# Patient Record
Sex: MALE | Race: WHITE | NOT HISPANIC OR LATINO | Employment: OTHER | URBAN - METROPOLITAN AREA
[De-identification: names, ages, dates, MRNs, and addresses within clinical notes are randomized per-mention and may not be internally consistent; named-entity substitution may affect disease eponyms.]

---

## 2023-02-07 ENCOUNTER — OFFICE VISIT (OUTPATIENT)
Dept: FAMILY MEDICINE | Facility: CLINIC | Age: 78
End: 2023-02-07
Payer: MEDICARE

## 2023-02-07 VITALS
OXYGEN SATURATION: 96 % | SYSTOLIC BLOOD PRESSURE: 130 MMHG | DIASTOLIC BLOOD PRESSURE: 82 MMHG | BODY MASS INDEX: 30.46 KG/M2 | WEIGHT: 201 LBS | TEMPERATURE: 98 F | HEART RATE: 64 BPM | HEIGHT: 68 IN

## 2023-02-07 DIAGNOSIS — E78.5 HYPERLIPIDEMIA, UNSPECIFIED HYPERLIPIDEMIA TYPE: ICD-10-CM

## 2023-02-07 DIAGNOSIS — I10 HYPERTENSION, UNSPECIFIED TYPE: ICD-10-CM

## 2023-02-07 DIAGNOSIS — E55.9 VITAMIN D DEFICIENCY: ICD-10-CM

## 2023-02-07 DIAGNOSIS — Z95.5 PRESENCE OF STENT IN CORONARY ARTERY: Primary | ICD-10-CM

## 2023-02-07 DIAGNOSIS — G62.9 NEUROPATHY: ICD-10-CM

## 2023-02-07 DIAGNOSIS — M1A.9XX0 CHRONIC GOUT WITHOUT TOPHUS, UNSPECIFIED CAUSE, UNSPECIFIED SITE: ICD-10-CM

## 2023-02-07 PROBLEM — I25.2 HISTORY OF MYOCARDIAL INFARCTION: Status: ACTIVE | Noted: 2017-12-24

## 2023-02-07 PROCEDURE — 3075F PR MOST RECENT SYSTOLIC BLOOD PRESS GE 130-139MM HG: ICD-10-PCS | Mod: CPTII,S$GLB,, | Performed by: FAMILY MEDICINE

## 2023-02-07 PROCEDURE — 1101F PR PT FALLS ASSESS DOC 0-1 FALLS W/OUT INJ PAST YR: ICD-10-PCS | Mod: CPTII,S$GLB,, | Performed by: FAMILY MEDICINE

## 2023-02-07 PROCEDURE — 1159F PR MEDICATION LIST DOCUMENTED IN MEDICAL RECORD: ICD-10-PCS | Mod: CPTII,S$GLB,, | Performed by: FAMILY MEDICINE

## 2023-02-07 PROCEDURE — 3079F PR MOST RECENT DIASTOLIC BLOOD PRESSURE 80-89 MM HG: ICD-10-PCS | Mod: CPTII,S$GLB,, | Performed by: FAMILY MEDICINE

## 2023-02-07 PROCEDURE — 99204 OFFICE O/P NEW MOD 45 MIN: CPT | Mod: S$GLB,,, | Performed by: FAMILY MEDICINE

## 2023-02-07 PROCEDURE — 1126F PR PAIN SEVERITY QUANTIFIED, NO PAIN PRESENT: ICD-10-PCS | Mod: CPTII,S$GLB,, | Performed by: FAMILY MEDICINE

## 2023-02-07 PROCEDURE — 99204 PR OFFICE/OUTPT VISIT, NEW, LEVL IV, 45-59 MIN: ICD-10-PCS | Mod: S$GLB,,, | Performed by: FAMILY MEDICINE

## 2023-02-07 PROCEDURE — 3079F DIAST BP 80-89 MM HG: CPT | Mod: CPTII,S$GLB,, | Performed by: FAMILY MEDICINE

## 2023-02-07 PROCEDURE — 3075F SYST BP GE 130 - 139MM HG: CPT | Mod: CPTII,S$GLB,, | Performed by: FAMILY MEDICINE

## 2023-02-07 PROCEDURE — 1159F MED LIST DOCD IN RCRD: CPT | Mod: CPTII,S$GLB,, | Performed by: FAMILY MEDICINE

## 2023-02-07 PROCEDURE — 1126F AMNT PAIN NOTED NONE PRSNT: CPT | Mod: CPTII,S$GLB,, | Performed by: FAMILY MEDICINE

## 2023-02-07 PROCEDURE — 3288F FALL RISK ASSESSMENT DOCD: CPT | Mod: CPTII,S$GLB,, | Performed by: FAMILY MEDICINE

## 2023-02-07 PROCEDURE — 3288F PR FALLS RISK ASSESSMENT DOCUMENTED: ICD-10-PCS | Mod: CPTII,S$GLB,, | Performed by: FAMILY MEDICINE

## 2023-02-07 PROCEDURE — 1101F PT FALLS ASSESS-DOCD LE1/YR: CPT | Mod: CPTII,S$GLB,, | Performed by: FAMILY MEDICINE

## 2023-02-07 RX ORDER — BENAZEPRIL HYDROCHLORIDE 20 MG/1
TABLET ORAL
COMMUNITY
Start: 2022-03-11

## 2023-02-07 RX ORDER — ALLOPURINOL 300 MG/1
300 TABLET ORAL
COMMUNITY
Start: 2022-04-06 | End: 2023-02-07 | Stop reason: SDUPTHER

## 2023-02-07 RX ORDER — METOPROLOL SUCCINATE 25 MG/1
TABLET, EXTENDED RELEASE ORAL
COMMUNITY
Start: 2022-03-14

## 2023-02-07 RX ORDER — IBUPROFEN 100 MG/5ML
1000 SUSPENSION, ORAL (FINAL DOSE FORM) ORAL DAILY
COMMUNITY

## 2023-02-07 RX ORDER — CHOLECALCIFEROL (VITAMIN D3) 25 MCG
1000 TABLET ORAL DAILY
COMMUNITY

## 2023-02-07 RX ORDER — LANOLIN ALCOHOL/MO/W.PET/CERES
1 CREAM (GRAM) TOPICAL
COMMUNITY

## 2023-02-07 RX ORDER — ALLOPURINOL 300 MG/1
300 TABLET ORAL DAILY
Qty: 90 TABLET | Refills: 3 | Status: SHIPPED | OUTPATIENT
Start: 2023-02-07 | End: 2024-02-08 | Stop reason: SDUPTHER

## 2023-02-07 RX ORDER — PANTOPRAZOLE SODIUM 40 MG/1
TABLET, DELAYED RELEASE ORAL
COMMUNITY
Start: 2023-01-06

## 2023-02-07 RX ORDER — ATORVASTATIN CALCIUM 40 MG/1
TABLET, FILM COATED ORAL
COMMUNITY
Start: 2022-12-24

## 2023-02-07 RX ORDER — LANOLIN ALCOHOL/MO/W.PET/CERES
100 CREAM (GRAM) TOPICAL DAILY
COMMUNITY

## 2023-02-07 NOTE — PROGRESS NOTES
"Family Medicine Note  [facility]    Chief Complaint   Patient presents with    Establish Care    Numbness     In both feet        HPI:  77 male here to establish care.    History of CAD, s/p MI and subsequent stent - Dr Yung, [place] - just had stress test last week    Blood Pressure at goal on medication, with patient reporting prescription compliance  Hyperlipidemia stable on appropriate intensity statin therapy  Gout stable on medication    Acutely, does report occasional numbness to B feet/toes.  Has since started vitamin supplement to help with this, and symptoms have slowed.      ROS: as above    Vitals:    02/07/23 1009 02/07/23 1026   BP: (!) 146/80 130/82   BP Location: Left arm    Patient Position: Sitting    BP Method: Medium (Manual)    Pulse: 64    Temp: 97.5 °F (36.4 °C)    TempSrc: Temporal    SpO2: 96%    Weight: 91.2 kg (201 lb)    Height: 5' 8" (1.727 m)       Body mass index is 30.56 kg/m².      General:  AOx3, well nourished and developed in no acute distress  Eyes:  PERRLA, EOMI, vision intact grossly  ENT:  normal hearing, moist oral mucosa  Neck:  trachea midline with no masses or thyromegaly  Heart:  RRR, no murmurs.  No edema noted, extremities warm and well perfused  Lungs:  clear to auscultation bilaterally with symmetric chest movement  Abdomen:  Soft, nontender, nondistended.  Normal bowel sounds  Musculoskeletal:  Normal gait.  Normal posture.  Normal muscular development with no joint swelling.  Neurological:  CN II-XII grossly intact. Symmetric strength and sensation  Psych:  Normal mood and affect.  Able to demonstrate good judgement and personal insight.      Assessment/Plan:    Presence of stent in coronary artery    Hyperlipidemia, unspecified hyperlipidemia type    Hypertension, unspecified type    Chronic gout without tophus, unspecified cause, unspecified site    Vitamin D deficiency    Neuropathy     1.  Stable, continue therapy - get " bloodwork from cardiology  2.  Stable on statin  3.  At goal  4.  Stable  5.  Continue supplementation  6.  Seems more tarsal tunnel.  Advised better arch and ankle support

## 2023-02-07 NOTE — PATIENT INSTRUCTIONS
Focus on better arch and ankle support with footwear    Will get bloodwork from Dr. Yung    Follow up 6 months

## 2024-02-08 ENCOUNTER — OFFICE VISIT (OUTPATIENT)
Dept: FAMILY MEDICINE | Facility: CLINIC | Age: 79
End: 2024-02-08
Payer: MEDICARE

## 2024-02-08 VITALS
WEIGHT: 195.13 LBS | HEIGHT: 68 IN | OXYGEN SATURATION: 92 % | BODY MASS INDEX: 29.57 KG/M2 | HEART RATE: 92 BPM | SYSTOLIC BLOOD PRESSURE: 118 MMHG | DIASTOLIC BLOOD PRESSURE: 60 MMHG

## 2024-02-08 DIAGNOSIS — G62.9 NEUROPATHY: ICD-10-CM

## 2024-02-08 DIAGNOSIS — E78.5 HYPERLIPIDEMIA, UNSPECIFIED HYPERLIPIDEMIA TYPE: ICD-10-CM

## 2024-02-08 DIAGNOSIS — I25.2 HISTORY OF MYOCARDIAL INFARCTION: ICD-10-CM

## 2024-02-08 DIAGNOSIS — I10 HYPERTENSION, UNSPECIFIED TYPE: ICD-10-CM

## 2024-02-08 DIAGNOSIS — M1A.9XX0 CHRONIC GOUT WITHOUT TOPHUS, UNSPECIFIED CAUSE, UNSPECIFIED SITE: Primary | ICD-10-CM

## 2024-02-08 PROCEDURE — 1126F AMNT PAIN NOTED NONE PRSNT: CPT | Mod: CPTII,S$GLB,, | Performed by: FAMILY MEDICINE

## 2024-02-08 PROCEDURE — 3074F SYST BP LT 130 MM HG: CPT | Mod: CPTII,S$GLB,, | Performed by: FAMILY MEDICINE

## 2024-02-08 PROCEDURE — 3078F DIAST BP <80 MM HG: CPT | Mod: CPTII,S$GLB,, | Performed by: FAMILY MEDICINE

## 2024-02-08 PROCEDURE — 1101F PT FALLS ASSESS-DOCD LE1/YR: CPT | Mod: CPTII,S$GLB,, | Performed by: FAMILY MEDICINE

## 2024-02-08 PROCEDURE — 3288F FALL RISK ASSESSMENT DOCD: CPT | Mod: CPTII,S$GLB,, | Performed by: FAMILY MEDICINE

## 2024-02-08 PROCEDURE — 1159F MED LIST DOCD IN RCRD: CPT | Mod: CPTII,S$GLB,, | Performed by: FAMILY MEDICINE

## 2024-02-08 PROCEDURE — 99214 OFFICE O/P EST MOD 30 MIN: CPT | Mod: S$GLB,,, | Performed by: FAMILY MEDICINE

## 2024-02-08 RX ORDER — GABAPENTIN 400 MG/1
400 CAPSULE ORAL 3 TIMES DAILY
COMMUNITY
Start: 2023-12-07 | End: 2024-02-08 | Stop reason: SDUPTHER

## 2024-02-08 RX ORDER — GABAPENTIN 400 MG/1
400 CAPSULE ORAL 3 TIMES DAILY
Qty: 270 CAPSULE | Refills: 3 | Status: SHIPPED | OUTPATIENT
Start: 2024-02-08 | End: 2025-02-07

## 2024-02-08 RX ORDER — ALLOPURINOL 300 MG/1
300 TABLET ORAL DAILY
Qty: 90 TABLET | Refills: 3 | Status: SHIPPED | OUTPATIENT
Start: 2024-02-08 | End: 2025-02-07

## 2024-02-08 NOTE — PROGRESS NOTES
"    [facility] [place]    Family Medicine Office Visit    Chief Complaint   Patient presents with    Follow-up    Establish Care        HPI:  1 year follow up    78 year old gentleman in good functional health    CAD stable - had stent in 2017 - actively sees Cardiology    Blood Pressure at goal on medication, with patient reporting prescription compliance  Hyperlipidemia stable on appropriate intensity statin therapy  Gout stable on allopurinol    Discussed some functional neuropathy last visit    ROS: as above    Vitals:    02/08/24 1102   BP: 118/60   BP Location: Right arm   Patient Position: Sitting   BP Method: Medium (Manual)   Pulse: 92   SpO2: (!) 92%   Weight: 88.5 kg (195 lb 1.6 oz)   Height: 5' 8" (1.727 m)      Body mass index is 29.66 kg/m².      General:  AOx3, well nourished and developed in no acute distress  Eyes:  PERRLA, EOMI, vision intact grossly  ENT:  normal hearing, moist oral mucosa  Neck:  trachea midline with no masses or thyromegaly  Heart:  RRR, no murmurs.  No edema noted, extremities warm and well perfused  Lungs:  clear to auscultation bilaterally with symmetric chest movement  Abdomen:  Soft, nontender, nondistended.  Normal bowel sounds  Musculoskeletal:  Normal gait.  Normal posture.  Normal muscular development with no joint swelling.  Neurological:  CN II-XII grossly intact. Symmetric strength and sensation  Psych:  Normal mood and affect.  Able to demonstrate good judgement and personal insight.      Assessment/Plan:    1. Chronic gout without tophus, unspecified cause, unspecified site  Overview:  toes      2. History of myocardial infarction    3. Hyperlipidemia, unspecified hyperlipidemia type    4. Hypertension, unspecified type    5. Neuropathy       Stable on allopurinol  Stable on statin and BP control  Stable as above  At goal  Stable on gabapentin      "

## 2024-06-26 ENCOUNTER — OFFICE VISIT (OUTPATIENT)
Dept: FAMILY MEDICINE | Facility: CLINIC | Age: 79
End: 2024-06-26
Payer: MEDICARE

## 2024-06-26 VITALS
WEIGHT: 168.81 LBS | OXYGEN SATURATION: 97 % | HEIGHT: 69 IN | HEART RATE: 97 BPM | BODY MASS INDEX: 25 KG/M2 | DIASTOLIC BLOOD PRESSURE: 68 MMHG | SYSTOLIC BLOOD PRESSURE: 126 MMHG

## 2024-06-26 DIAGNOSIS — B96.89 BACTERIAL SINUSITIS: Primary | ICD-10-CM

## 2024-06-26 DIAGNOSIS — J32.9 BACTERIAL SINUSITIS: Primary | ICD-10-CM

## 2024-06-26 PROCEDURE — 3074F SYST BP LT 130 MM HG: CPT | Mod: CPTII,S$GLB,, | Performed by: STUDENT IN AN ORGANIZED HEALTH CARE EDUCATION/TRAINING PROGRAM

## 2024-06-26 PROCEDURE — 3078F DIAST BP <80 MM HG: CPT | Mod: CPTII,S$GLB,, | Performed by: STUDENT IN AN ORGANIZED HEALTH CARE EDUCATION/TRAINING PROGRAM

## 2024-06-26 PROCEDURE — 99214 OFFICE O/P EST MOD 30 MIN: CPT | Mod: S$GLB,,, | Performed by: STUDENT IN AN ORGANIZED HEALTH CARE EDUCATION/TRAINING PROGRAM

## 2024-06-26 PROCEDURE — 1160F RVW MEDS BY RX/DR IN RCRD: CPT | Mod: CPTII,S$GLB,, | Performed by: STUDENT IN AN ORGANIZED HEALTH CARE EDUCATION/TRAINING PROGRAM

## 2024-06-26 PROCEDURE — 1159F MED LIST DOCD IN RCRD: CPT | Mod: CPTII,S$GLB,, | Performed by: STUDENT IN AN ORGANIZED HEALTH CARE EDUCATION/TRAINING PROGRAM

## 2024-06-26 PROCEDURE — 1126F AMNT PAIN NOTED NONE PRSNT: CPT | Mod: CPTII,S$GLB,, | Performed by: STUDENT IN AN ORGANIZED HEALTH CARE EDUCATION/TRAINING PROGRAM

## 2024-06-26 RX ORDER — AZITHROMYCIN 250 MG/1
TABLET, FILM COATED ORAL
Qty: 6 TABLET | Refills: 0 | Status: SHIPPED | OUTPATIENT
Start: 2024-06-26 | End: 2024-07-01

## 2024-06-26 NOTE — PROGRESS NOTES
"  [facility]    [facility]  [address], [address]  [place] [address]    Subjective     Patient ID: Rell Wilson is a 78 y.o. male who comes to the clinic for an acute visit.    Chief Complaint: Sore Throat (Patient states he has a cough, sore throat, chest congestion and nasal drip for over a week. )    Cough and sore throat - started about a week ago, works at the [employer]. Hurts when he coughs, now having pleurisy when he coughs.     ROS negative unless stated above       Objective     Vitals:    06/26/24 0821   BP: 126/68   Pulse: 97   SpO2: 97%   Weight: 76.6 kg (168 lb 12.8 oz)   Height: 5' 9" (1.753 m)       Wt Readings from Last 3 Encounters:   06/26/24 0821 76.6 kg (168 lb 12.8 oz)   02/08/24 1102 88.5 kg (195 lb 1.6 oz)   02/07/23 1009 91.2 kg (201 lb)        Physical Exam  Vitals reviewed.   Constitutional:       Appearance: Normal appearance.   HENT:      Head: Normocephalic and atraumatic.      Mouth/Throat:      Pharynx: Posterior oropharyngeal erythema present.   Eyes:      Extraocular Movements: Extraocular movements intact.      Pupils: Pupils are equal, round, and reactive to light.   Cardiovascular:      Rate and Rhythm: Normal rate.      Pulses: Normal pulses.      Heart sounds: Normal heart sounds.   Pulmonary:      Effort: Pulmonary effort is normal. No respiratory distress.      Breath sounds: Normal breath sounds.   Musculoskeletal:         General: Normal range of motion.      Cervical back: Normal range of motion and neck supple.   Skin:     General: Skin is dry.      Capillary Refill: Capillary refill takes less than 2 seconds.   Neurological:      General: No focal deficit present.      Mental Status: He is alert and oriented to person, place, and time. Mental status is at baseline.   Psychiatric:         Mood and Affect: Mood normal.         Behavior: Behavior normal.         Thought Content: Thought content normal.         Current " Outpatient Medications   Medication Instructions    allopurinoL (ZYLOPRIM) 300 mg, Oral, Daily    ascorbic acid (vitamin C) (VITAMIN C) 1,000 mg, Oral, Daily    atorvastatin (LIPITOR) 40 MG tablet Oral    azithromycin (Z-SANTHOSH) 250 MG tablet Take 2 tablets by mouth on day 1; Take 1 tablet by mouth on days 2-5<BR>    benazepriL (LOTENSIN) 20 MG tablet   TAKE 1 TABLET ONCE DAILY, # 90, 0 Refill(s), Maintenance    cyanocobalamin (VITAMIN B-12) 100 mcg, Oral, Daily    ferrous sulfate (FEOSOL) Tab tablet 1 tablet, Oral, With breakfast    gabapentin (NEURONTIN) 400 mg, Oral, 3 times daily    metoprolol succinate (TOPROL-XL) 25 MG 24 hr tablet   TAKE 1 TABLET ONCE DAILY, # 90, 0 Refill(s), Maintenance    pantoprazole (PROTONIX) 40 MG tablet Oral    vitamin D (VITAMIN D3) 1,000 Units, Oral, Daily           Assessment and Plan     1. Bacterial sinusitis  -     azithromycin (Z-SANTHOSH) 250 MG tablet; Take 2 tablets by mouth on day 1; Take 1 tablet by mouth on days 2-5  Dispense: 6 tablet; Refill: 0  -     POCT Strep A, Molecular        Here for an acute visit    Treating w/ azithromycin, will continue using flonase, zyrtec, tylenol, ibuprofen, and tea w/ honey    RTC w/ PCP in February         I encouraged the patient to take all medications as prescribed and to keep follow up appointments with their providers. Patient stated they had no other concerns. Questions were invited and answered. Follow up sooner if need. ED precautions given.    Fitz Garcia MD  06/26/2024 8:28 AM

## 2024-12-19 ENCOUNTER — TELEPHONE (OUTPATIENT)
Dept: FAMILY MEDICINE | Facility: CLINIC | Age: 79
End: 2024-12-19
Payer: MEDICARE

## 2024-12-19 NOTE — TELEPHONE ENCOUNTER
Attempted to call patient 10/11. And 12/19(2x). No answer. Left voicemail to reschedule appointment.

## 2024-12-28 NOTE — TELEPHONE ENCOUNTER
Care Due:                  Date            Visit Type   Department     Provider  --------------------------------------------------------------------------------                                SAME DAY -                              ESTABLISHED   [facility]  Last Visit: 06-      PATIENT      MEDICINE       Fitz Garcia  Next Visit: None Scheduled  None         None Found                                                            Last  Test          Frequency    Reason                     Performed    Due Date  --------------------------------------------------------------------------------    CBC.........  12 months..  allopurinoL..............  Not Found    Overdue    CMP.........  12 months..  allopurinoL..............  Not Found    Overdue    Uric Acid...  12 months..  allopurinoL..............  Not Found    Overdue    [employer] Embedded Care Due Messages. Reference number: [id number].   12/28/2024 2:07:25 AM CST

## 2024-12-29 NOTE — TELEPHONE ENCOUNTER
Refill Routing Note   Medication(s) are not appropriate for processing by [facility] for the following reason(s):        Required labs outdated    [facility] action(s):  Defer     Requires labs : Yes             Appointments  past 12m or future 3m with PCP    Date Provider   Last Visit   2/8/2024 Fitz Arshad MD   Next Visit   Visit date not found Fitz Arshad MD   ED visits in past 90 days: 0        Note composed:6:41 PM 12/28/2024

## 2024-12-30 RX ORDER — ALLOPURINOL 300 MG/1
300 TABLET ORAL
Qty: 90 TABLET | Refills: 0 | Status: SHIPPED | OUTPATIENT
Start: 2024-12-30

## 2025-02-19 ENCOUNTER — OFFICE VISIT (OUTPATIENT)
Dept: FAMILY MEDICINE | Facility: CLINIC | Age: 80
End: 2025-02-19
Payer: MEDICARE

## 2025-02-19 VITALS
SYSTOLIC BLOOD PRESSURE: 120 MMHG | HEIGHT: 69 IN | DIASTOLIC BLOOD PRESSURE: 62 MMHG | BODY MASS INDEX: 26.38 KG/M2 | WEIGHT: 178.13 LBS

## 2025-02-19 DIAGNOSIS — M72.2 PLANTAR FASCIITIS: ICD-10-CM

## 2025-02-19 DIAGNOSIS — E78.5 HYPERLIPIDEMIA, UNSPECIFIED HYPERLIPIDEMIA TYPE: ICD-10-CM

## 2025-02-19 DIAGNOSIS — L30.9 DERMATITIS: ICD-10-CM

## 2025-02-19 DIAGNOSIS — I10 HYPERTENSION, UNSPECIFIED TYPE: ICD-10-CM

## 2025-02-19 DIAGNOSIS — I25.2 HISTORY OF MYOCARDIAL INFARCTION: Primary | ICD-10-CM

## 2025-02-19 DIAGNOSIS — M1A.9XX0 CHRONIC GOUT WITHOUT TOPHUS, UNSPECIFIED CAUSE, UNSPECIFIED SITE: ICD-10-CM

## 2025-02-19 PROCEDURE — 3078F DIAST BP <80 MM HG: CPT | Mod: CPTII,S$GLB,, | Performed by: FAMILY MEDICINE

## 2025-02-19 PROCEDURE — 99214 OFFICE O/P EST MOD 30 MIN: CPT | Mod: S$GLB,,, | Performed by: FAMILY MEDICINE

## 2025-02-19 PROCEDURE — 1125F AMNT PAIN NOTED PAIN PRSNT: CPT | Mod: CPTII,S$GLB,, | Performed by: FAMILY MEDICINE

## 2025-02-19 PROCEDURE — 1159F MED LIST DOCD IN RCRD: CPT | Mod: CPTII,S$GLB,, | Performed by: FAMILY MEDICINE

## 2025-02-19 PROCEDURE — G2211 COMPLEX E/M VISIT ADD ON: HCPCS | Mod: S$GLB,,, | Performed by: FAMILY MEDICINE

## 2025-02-19 PROCEDURE — 3288F FALL RISK ASSESSMENT DOCD: CPT | Mod: CPTII,S$GLB,, | Performed by: FAMILY MEDICINE

## 2025-02-19 PROCEDURE — 3074F SYST BP LT 130 MM HG: CPT | Mod: CPTII,S$GLB,, | Performed by: FAMILY MEDICINE

## 2025-02-19 PROCEDURE — 1101F PT FALLS ASSESS-DOCD LE1/YR: CPT | Mod: CPTII,S$GLB,, | Performed by: FAMILY MEDICINE

## 2025-02-19 RX ORDER — ALLOPURINOL 300 MG/1
300 TABLET ORAL DAILY
Qty: 90 TABLET | Refills: 3 | Status: SHIPPED | OUTPATIENT
Start: 2025-02-19 | End: 2025-02-19

## 2025-02-19 RX ORDER — TRIAMCINOLONE ACETONIDE 1 MG/G
CREAM TOPICAL 2 TIMES DAILY
Qty: 454 G | Refills: 3 | Status: SHIPPED | OUTPATIENT
Start: 2025-02-19

## 2025-02-19 RX ORDER — ALLOPURINOL 300 MG/1
300 TABLET ORAL DAILY
Qty: 90 TABLET | Refills: 3 | Status: SHIPPED | OUTPATIENT
Start: 2025-02-19

## 2025-02-19 NOTE — PATIENT INSTRUCTIONS
Massage foot daily with voltaren gel - anti-inflammatory gel  Roll foot over ice twice a day  Perform stretches to foot daily  Get arch support insole    Use prescription cream for rash

## 2025-02-19 NOTE — PROGRESS NOTES
"    [facility] - [place], [place]    Family Medicine Office Visit    Chief Complaint   Patient presents with    Follow-up        HPI:  routine followup:    Gout stable  CAD stable s/p stent    Blood Pressure at goal on medication, with patient reporting prescription compliance  Hyperlipidemia stable on appropriate intensity statin therapy    Reporting L heel pain.  Worse with ambulation.  Rest helps.  On feet most of day with work.  Pressure to plantar fascia seems to help somewhat    Vague dermatitis to L lower leg    ROS: as above    Vitals:    02/19/25 0924   BP: 120/62   BP Location: Right arm   Patient Position: Sitting   Weight: 80.8 kg (178 lb 1.6 oz)   Height: 5' 9" (1.753 m)      Body mass index is 26.3 kg/m².      General:  AOx3, well nourished and developed in no acute distress  Eyes:  PERRLA, EOMI, vision intact grossly  ENT:  normal hearing, moist oral mucosa  Neck:  trachea midline with no masses or thyromegaly  Heart:  RRR, no murmurs.  No edema noted, extremities warm and well perfused  Lungs:  clear to auscultation bilaterally with symmetric chest movement  Abdomen:  Soft, nontender, nondistended.  Normal bowel sounds  Musculoskeletal:  Normal gait.  Normal posture.  Normal muscular development with no joint swelling.  Neurological:  CN II-XII grossly intact. Symmetric strength and sensation  Psych:  Normal mood and affect.  Able to demonstrate good judgement and personal insight.  +dermatitis to leg    Assessment/Plan:    1. History of myocardial infarction    2. Hyperlipidemia, unspecified hyperlipidemia type    3. Hypertension, unspecified type    4. Chronic gout without tophus, unspecified cause, unspecified site  Overview:  toes      5. Plantar fasciitis    6. Dermatitis    Other orders  -     allopurinoL (ZYLOPRIM) 300 MG tablet; Take 1 tablet (300 mg total) by mouth once daily.  Dispense: 90 tablet; Refill: 3  -     triamcinolone acetonide 0.1% (KENALOG) 0.1 % " cream; Apply topically 2 (two) times daily.  Dispense: 454 g; Refill: 3         Stable  Stable on statin  At goal  Stable on medication  Advised on therapy to resolve  Topical steroids    Visit today included increased complexity associated with the care of the episodic problem htn, hld, dermatitis, plantar fascia addressed and managing the longitudinal care of the patient due to the serious and/or complex managed problem(s) htn, hld, dermatitis, plantar fascia.

## 2025-08-20 ENCOUNTER — OFFICE VISIT (OUTPATIENT)
Dept: FAMILY MEDICINE | Facility: CLINIC | Age: 80
End: 2025-08-20
Payer: MEDICARE

## 2025-08-20 VITALS
SYSTOLIC BLOOD PRESSURE: 138 MMHG | BODY MASS INDEX: 26.58 KG/M2 | DIASTOLIC BLOOD PRESSURE: 80 MMHG | HEIGHT: 69 IN | WEIGHT: 179.44 LBS | HEART RATE: 57 BPM | OXYGEN SATURATION: 97 %

## 2025-08-20 DIAGNOSIS — E78.5 HYPERLIPIDEMIA, UNSPECIFIED HYPERLIPIDEMIA TYPE: Primary | ICD-10-CM

## 2025-08-20 DIAGNOSIS — I10 HYPERTENSION, UNSPECIFIED TYPE: ICD-10-CM

## 2025-08-20 DIAGNOSIS — R49.1 LOSS OF VOICE: ICD-10-CM

## 2025-08-20 DIAGNOSIS — R79.9 ABNORMAL FINDING OF BLOOD CHEMISTRY, UNSPECIFIED: ICD-10-CM

## 2025-08-20 DIAGNOSIS — M1A.9XX0 CHRONIC GOUT WITHOUT TOPHUS, UNSPECIFIED CAUSE, UNSPECIFIED SITE: ICD-10-CM

## 2025-08-20 DIAGNOSIS — Z95.5 PRESENCE OF STENT IN CORONARY ARTERY: ICD-10-CM

## 2025-08-20 PROCEDURE — 3288F FALL RISK ASSESSMENT DOCD: CPT | Mod: CPTII,S$GLB,, | Performed by: FAMILY MEDICINE

## 2025-08-20 PROCEDURE — 3079F DIAST BP 80-89 MM HG: CPT | Mod: CPTII,S$GLB,, | Performed by: FAMILY MEDICINE

## 2025-08-20 PROCEDURE — 3075F SYST BP GE 130 - 139MM HG: CPT | Mod: CPTII,S$GLB,, | Performed by: FAMILY MEDICINE

## 2025-08-20 PROCEDURE — 1159F MED LIST DOCD IN RCRD: CPT | Mod: CPTII,S$GLB,, | Performed by: FAMILY MEDICINE

## 2025-08-20 PROCEDURE — 1101F PT FALLS ASSESS-DOCD LE1/YR: CPT | Mod: CPTII,S$GLB,, | Performed by: FAMILY MEDICINE

## 2025-08-20 PROCEDURE — 99214 OFFICE O/P EST MOD 30 MIN: CPT | Mod: S$GLB,,, | Performed by: FAMILY MEDICINE

## 2025-08-20 PROCEDURE — 1126F AMNT PAIN NOTED NONE PRSNT: CPT | Mod: CPTII,S$GLB,, | Performed by: FAMILY MEDICINE

## 2025-08-20 RX ORDER — ATORVASTATIN CALCIUM 40 MG/1
40 TABLET, FILM COATED ORAL DAILY
Qty: 90 TABLET | Refills: 3 | Status: SHIPPED | OUTPATIENT
Start: 2025-08-20

## 2025-08-20 RX ORDER — GABAPENTIN 400 MG/1
400 CAPSULE ORAL 3 TIMES DAILY
Qty: 270 CAPSULE | Refills: 3 | Status: SHIPPED | OUTPATIENT
Start: 2025-08-20 | End: 2026-08-20

## 2025-08-20 RX ORDER — BENAZEPRIL HYDROCHLORIDE 20 MG/1
20 TABLET ORAL DAILY
Qty: 90 TABLET | Refills: 3 | Status: SHIPPED | OUTPATIENT
Start: 2025-08-20